# Patient Record
Sex: FEMALE | Race: WHITE | NOT HISPANIC OR LATINO | Employment: OTHER | URBAN - NONMETROPOLITAN AREA
[De-identification: names, ages, dates, MRNs, and addresses within clinical notes are randomized per-mention and may not be internally consistent; named-entity substitution may affect disease eponyms.]

---

## 2024-07-20 ENCOUNTER — OFFICE VISIT (OUTPATIENT)
Dept: URGENT CARE | Facility: CLINIC | Age: 79
End: 2024-07-20
Payer: COMMERCIAL

## 2024-07-20 ENCOUNTER — APPOINTMENT (OUTPATIENT)
Dept: RADIOLOGY | Facility: CLINIC | Age: 79
End: 2024-07-20
Payer: COMMERCIAL

## 2024-07-20 VITALS
SYSTOLIC BLOOD PRESSURE: 168 MMHG | OXYGEN SATURATION: 98 % | DIASTOLIC BLOOD PRESSURE: 98 MMHG | RESPIRATION RATE: 20 BRPM | TEMPERATURE: 98.1 F | HEART RATE: 66 BPM

## 2024-07-20 DIAGNOSIS — M25.461 EFFUSION, RIGHT KNEE: Primary | ICD-10-CM

## 2024-07-20 DIAGNOSIS — M25.561 ACUTE PAIN OF RIGHT KNEE: ICD-10-CM

## 2024-07-20 PROCEDURE — 73562 X-RAY EXAM OF KNEE 3: CPT

## 2024-07-20 PROCEDURE — 99213 OFFICE O/P EST LOW 20 MIN: CPT

## 2024-07-20 RX ORDER — METOPROLOL SUCCINATE 25 MG/1
50 TABLET, EXTENDED RELEASE ORAL DAILY
COMMUNITY

## 2024-07-20 RX ORDER — ATORVASTATIN CALCIUM 40 MG/1
40 TABLET, FILM COATED ORAL DAILY
COMMUNITY
Start: 2024-06-04

## 2024-07-20 RX ORDER — ALENDRONATE SODIUM 70 MG/1
70 TABLET ORAL
COMMUNITY
Start: 2024-06-18

## 2024-07-20 RX ORDER — VALSARTAN 320 MG/1
320 TABLET ORAL DAILY
COMMUNITY
Start: 2024-06-11

## 2024-07-20 RX ORDER — SERTRALINE HYDROCHLORIDE 100 MG/1
100 TABLET, FILM COATED ORAL DAILY
COMMUNITY

## 2024-07-20 NOTE — PROGRESS NOTES
[facility]        NAME: Kassi Hawk is a 79 y.o. female  : 1945    MRN: [id number]  DATE: 2024  TIME: 11:52 AM    Assessment and Plan   Effusion, right knee [M25.461]  1. Effusion, right knee  XR knee 3 vw right non injury    Diclofenac Sodium (VOLTAREN) 1 %            Patient Instructions       Follow up with PCP in 3-5 days.  Proceed to  ER if symptoms worsen.    Chief Complaint     Chief Complaint   Patient presents with    Knee Pain     Pt has right knee pain that started about 3 days ago when it initially gave out on her. Since then has posterior knee pain when putting weight on the leg. Also reports pain shooting down from her right upper leg down to her lower leg          History of Present Illness       HPI    Review of Systems   Review of Systems      Current Medications       Current Outpatient Medications:     alendronate (FOSAMAX) 70 mg tablet, Take 70 mg by mouth every 7 days, Disp: , Rfl:     atorvastatin (LIPITOR) 40 mg tablet, Take 40 mg by mouth daily, Disp: , Rfl:     carbidopa-levodopa (SINEMET)  mg per tablet, Take 1 tablet by mouth 3 (three) times a day, Disp: , Rfl:     Diclofenac Sodium (VOLTAREN) 1 %, Apply 2 g topically 4 (four) times a day, Disp: 50 g, Rfl: 0    metoprolol succinate (TOPROL-XL) 25 mg 24 hr tablet, Take 50 mg by mouth daily, Disp: , Rfl:     sertraline (ZOLOFT) 100 mg tablet, Take 100 mg by mouth daily, Disp: , Rfl:     valsartan (DIOVAN) 320 MG tablet, Take 320 mg by mouth daily, Disp: , Rfl:     Current Allergies     Allergies as of 2024    (No Known Allergies)            The following portions of the patient's history were reviewed and updated as appropriate: allergies, current medications, past family history, past medical history, past social history, past surgical history and problem list.     No past medical history on file.    No past surgical history on file.    No family history on file.      Medications have been  verified.        Objective   /98   Pulse 66   Temp 98.1 °F (36.7 °C)   Resp 20   SpO2 98%        Physical Exam     Physical Exam

## 2024-07-20 NOTE — PATIENT INSTRUCTIONS
Use the voltaren gel 4 times a day  Avoid oral NSAIDs. May take tylenol.   Wear the knee brace  RICE.  Follow with ortho if needed.

## 2024-07-20 NOTE — PROGRESS NOTES
[facility]        NAME: Kassi Hawk is a 79 y.o. female  : 1945    MRN: [id number]  DATE: 2024  TIME: 3:17 PM    Assessment and Plan   Effusion, right knee [M25.461]  1. Effusion, right knee  XR knee 3 vw right non injury    Diclofenac Sodium (VOLTAREN) 1 %        Xray negative for acute osseous abnormality. Effusion noted. Hinged knee brace applied. Recommend voltaren gel as GFR <60    Patient Instructions   Use the voltaren gel 4 times a day  Avoid oral NSAIDs. May take tylenol.   Wear the knee brace  RICE.  Follow with ortho if needed.    Follow up with PCP in 3-5 days.  Proceed to  ER if symptoms worsen.    Chief Complaint     Chief Complaint   Patient presents with    Knee Pain     Pt has right knee pain that started about 3 days ago when it initially gave out on her. Since then has posterior knee pain when putting weight on the leg. Also reports pain shooting down from her right upper leg down to her lower leg          History of Present Illness       Patient is a 79 year old female who presents to the office today for right knee pain. She states she had stepped on it 3 days ago and the knee gave out. Since she has had some swelling and limited ROM. Knee feels like it is going to give out         Review of Systems   Review of Systems   Constitutional:  Negative for chills and fever.   Musculoskeletal:  Positive for arthralgias and joint swelling.   All other systems reviewed and are negative.        Current Medications       Current Outpatient Medications:     alendronate (FOSAMAX) 70 mg tablet, Take 70 mg by mouth every 7 days, Disp: , Rfl:     atorvastatin (LIPITOR) 40 mg tablet, Take 40 mg by mouth daily, Disp: , Rfl:     carbidopa-levodopa (SINEMET)  mg per tablet, Take 1 tablet by mouth 3 (three) times a day, Disp: , Rfl:     Diclofenac Sodium (VOLTAREN) 1 %, Apply 2 g topically 4 (four) times a day, Disp: 50 g, Rfl: 0    metoprolol succinate (TOPROL-XL) 25 mg 24 hr  tablet, Take 50 mg by mouth daily, Disp: , Rfl:     sertraline (ZOLOFT) 100 mg tablet, Take 100 mg by mouth daily, Disp: , Rfl:     valsartan (DIOVAN) 320 MG tablet, Take 320 mg by mouth daily, Disp: , Rfl:     Current Allergies     Allergies as of 07/20/2024    (No Known Allergies)            The following portions of the patient's history were reviewed and updated as appropriate: allergies, current medications, past family history, past medical history, past social history, past surgical history and problem list.     No past medical history on file.    No past surgical history on file.    No family history on file.      Medications have been verified.        Objective   /98   Pulse 66   Temp 98.1 °F (36.7 °C)   Resp 20   SpO2 98%        Physical Exam     Physical Exam  Vitals and nursing note reviewed.   Constitutional:       Appearance: Normal appearance. She is normal weight.   Cardiovascular:      Rate and Rhythm: Normal rate and regular rhythm.      Pulses: Normal pulses.   Pulmonary:      Effort: Pulmonary effort is normal.   Musculoskeletal:         General: Swelling and tenderness present.      Right knee: Swelling and effusion present. No bony tenderness or crepitus. Decreased range of motion (s/t swelling. pain with full flexion and extension). Tenderness present over the patellar tendon. No MCL, LCL, ACL or PCL tenderness. Normal alignment, normal meniscus and normal patellar mobility.      Instability Tests: Anterior drawer test negative. Posterior drawer test negative. Anterior Lachman test negative. Medial Kiran test negative and lateral Kiran test negative.   Skin:     General: Skin is warm.      Capillary Refill: Capillary refill takes less than 2 seconds.   Neurological:      Mental Status: She is alert.